# Patient Record
Sex: MALE | Race: ASIAN | NOT HISPANIC OR LATINO | URBAN - METROPOLITAN AREA
[De-identification: names, ages, dates, MRNs, and addresses within clinical notes are randomized per-mention and may not be internally consistent; named-entity substitution may affect disease eponyms.]

---

## 2022-03-14 VITALS
SYSTOLIC BLOOD PRESSURE: 140 MMHG | DIASTOLIC BLOOD PRESSURE: 92 MMHG | OXYGEN SATURATION: 98 % | TEMPERATURE: 97 F | WEIGHT: 190.04 LBS | HEIGHT: 66 IN | HEART RATE: 54 BPM | RESPIRATION RATE: 16 BRPM

## 2022-03-14 RX ORDER — CHLORHEXIDINE GLUCONATE 213 G/1000ML
1 SOLUTION TOPICAL ONCE
Refills: 0 | Status: DISCONTINUED | OUTPATIENT
Start: 2022-03-16 | End: 2022-03-31

## 2022-03-14 NOTE — H&P ADULT - NSHPLABSRESULTS_GEN_ALL_CORE
17.2   10.49 )-----------( 200      ( 16 Mar 2022 14:20 )             50.5       03-16    139  |  105  |  13  ----------------------------<  158<H>  4.4   |  21<L>  |  0.79    Ca    9.1      16 Mar 2022 14:20    TPro  6.9  /  Alb  4.2  /  TBili  0.8  /  DBili  x   /  AST  18  /  ALT  26  /  AlkPhos  104  03-16      PT/INR - ( 16 Mar 2022 14:20 )   PT: 13.0 sec;   INR: 1.09          PTT - ( 16 Mar 2022 14:20 )  PTT:29.5 sec    CARDIAC MARKERS ( 16 Mar 2022 14:20 )  x     / x     / 69 U/L / x     / 3.2 ng/mL            EKG: 17.2   10.49 )-----------( 200      ( 16 Mar 2022 14:20 )             50.5       03-16    139  |  105  |  13  ----------------------------<  158<H>  4.4   |  21<L>  |  0.79    Ca    9.1      16 Mar 2022 14:20    TPro  6.9  /  Alb  4.2  /  TBili  0.8  /  DBili  x   /  AST  18  /  ALT  26  /  AlkPhos  104  03-16      PT/INR - ( 16 Mar 2022 14:20 )   PT: 13.0 sec;   INR: 1.09          PTT - ( 16 Mar 2022 14:20 )  PTT:29.5 sec    CARDIAC MARKERS ( 16 Mar 2022 14:20 )  x     / x     / 69 U/L / x     / 3.2 n

## 2022-03-14 NOTE — H&P ADULT - ASSESSMENT
57 y/o Mandarin speaking M, recent active smoker (quit 5 days ago), PMH of HTN, HLD, newly diagnosed DM (HgA1c 8.5 on recent labs 3/15), Dilated cardiomyopathy (EF 29%) with frequent PVCs, ?AFib (cannot tolerate Xarelto 2/2 hematuria), Kidney stone, GIB in 11/2021 (now on ASA) who presented to outpatient cardiologist Dr. Kaminski with c/o class IV anginal equivalent symptoms and was found with an abnormal ECHO showing EF 29%, newly diagnosed cardiomyopathy.  Given patient's risk factors, CCS Class III/IV anginal equivalent symptoms and recent abnormal ECHO,  patient is recommended for cardiac catheterization with possible intervention if clinically indicated.     EKG:	  				  ASA _____				Mallampati class: _________	            Anginal Class: _________    -Allergy Status:   -H/H = *****Pt denies BRBPR, hematuria, hematochezia, melena. Pt given ASA:  and Plavix:     -BUN/Cr = **. EF****. Euvolemic on exam. IV NS @ *****started pre procedure    Sedation Plan:   ? None   ? Moderate    ?  Deep    ?  General Anesthesia   Patient Is Suitable Candidate For Sedation?     ? Yes   ? No   ? Not Applicable     Risks & benefits of procedure and sedation and risks and benefits for the alternative therapy have been explained to the patient in layman’s terms including but not limited to: allergic reaction, bleeding, infection, arrhythmia, respiratory compromise, renal and vascular compromise, limb damage, MI, CVA, emergent CABG/Vascular Surgery and death. Informed consent obtained and in chart.   55 y/o Mandarin speaking M, recent active smoker (quit 5 days ago), PMH of HTN, HLD, newly diagnosed DM (HgA1c 8.5 on recent labs 3/15), Dilated cardiomyopathy (EF 29%) with frequent PVCs, ?AFib (cannot tolerate Xarelto 2/2 hematuria), Kidney stone, GIB in 11/2021 (now on ASA) who presented to outpatient cardiologist Dr. Kaminski with c/o class IV anginal equivalent symptoms and was found with an abnormal ECHO showing EF 29%, newly diagnosed cardiomyopathy.  Given patient's risk factors, CCS Class III/IV anginal equivalent symptoms and recent abnormal ECHO,  patient is recommended for cardiac catheterization with possible intervention if clinically indicated.     EKG:	AF @ 104 bpm,  Bigimeny	  Mallampati class: II    Anginal Class: III    -Allergy Status: NKDA  -H/H = 17.2/50.   Pt denies BRBPR, hematuria, hematochezia, melena. Pt took home ASA 81mg and was loaded with Plavix 600mg:     -BUN/Cr = 13/0.79.  EF 29%.  Euvolemic on exam. IV NS @ 50cc with frequent lung checks, started pre procedure    Sedation Plan: Moderate    Patient Is Suitable Candidate For Sedation:  Yes      Risks & benefits of procedure and sedation and risks and benefits for the alternative therapy have been explained to the patient in layman’s terms including but not limited to: allergic reaction, bleeding, infection, arrhythmia, respiratory compromise, renal and vascular compromise, limb damage, MI, CVA, emergent CABG/Vascular Surgery and death. Informed consent obtained and in chart.

## 2022-03-14 NOTE — H&P ADULT - HISTORY OF PRESENT ILLNESS
Cardiologist: Dr. Montana Lindo  Escort:  Pharmacy: [facility] [phone]  COVID:    *Meds verified w/ pharmacy on 3/14 (pt ordered for Entresto 24/26 BID however hasn't started bc needs prior auth, didn't include in med rec)*    55 yo ____ speaking M, active smoker, with a PMH of HTN, HLD, dilated cardiomyopathy (EF 29%) with frequent PVCs, ?AFib (cannot tolerate Xarelto 2/2 hematuria - hx of kidney stone), hx of GIB in 11/2021 (now on ASA) who presented to outpatient cardiologist Dr. Kaminski with complaints of MORILLO of moderate exertion over the past month, relieved with rest. Denies CP, dizziness, diaphoresis, palpitations, orthopnea/PND, abdominal pain, N/V/D, urinary sx, BRBPR, hematuria, melena, LE swelling, recent travel/sick contacts/illness. Echocardiogram 3/12/22: LVEF 29%, non significant valvular dz. In light of patient's risk factors, CCS angina equivalent class ____ symptoms and newly reduced EF w/ frequent PVCs, patient is referred for cardiac catheterization with possible intervention if clinically indicated.  Cardiologist: Dr. Montana Lindo  Escort:   Pharmacy: [facility] [phone]  COVID:     *Meds verified w/ pharmacy on 3/14 (pt ordered for Entresto 24/26 BID however hasn't started bc needs prior auth, didn't include in med rec)*    57 yo ____ speaking M, active smoker, with a PMH of HTN, HLD, dilated cardiomyopathy (EF 29%) with frequent PVCs, ?AFib (cannot tolerate Xarelto 2/2 hematuria - hx of kidney stone), hx of GIB in 11/2021 (now on ASA) who presented to outpatient cardiologist Dr. Kaminski with complaints of MORILLO of moderate exertion over the past month, relieved with rest. Denies CP, dizziness, diaphoresis, palpitations, orthopnea/PND, abdominal pain, N/V/D, urinary sx, BRBPR, hematuria, melena, LE swelling, recent travel/sick contacts/illness. Echocardiogram 3/12/22: LVEF 29%, non significant valvular dz. In light of patient's risk factors, CCS angina equivalent class ____ symptoms and newly reduced EF w/ frequent PVCs, patient is referred for cardiac catheterization with possible intervention if clinically indicated.  Cardiologist: Dr. Montana Lindo  Escort: friend  Pharmacy: [facility] [phone]  COVID: no documentation    *Meds verified w/ pharmacy on 3/14 (pt ordered for Entresto 24/26 BID, pt has not started taking, pending insurance auth)     57 y/o Mandarin speaking M, recent active smoker (quit 5 days ago), PMH of HTN, HLD, newly diagnosed DM (HgA1c 8.5 on recent labs 3/15), Dilated cardiomyopathy (EF 29%) with frequent PVCs, ?AFib (cannot tolerate Xarelto 2/2 hematuria), Kidney stone,  GIB in 11/2021 (now on ASA) who presented to outpatient cardiologist Dr. Kaminski with c/o MORILLO with moderate exertion, symptoms began over the past month, relieved with rest. Today pt is endorsing worsening SOB, now at rest, denies active CP, palpitations, diaphoresis, orthopnea/PND, abdominal pain, N/V/D, urinary sx, BRBPR, hematuria, melena, LE swelling, recent travel/sick contacts/illness.  Subsequent Echocardiogram 3/12/22: LVEF 29%, non significant valvular dz.    In light of patient's risk factors, CCS Class III/IV anginal equivalent symptoms and recent abnormal ECHO,  patient is recommended for cardiac catheterization with possible intervention if clinically indicated.  Cardiologist: Dr. Montana Lindo  Escort: friend  [facility] [phone]  COVID: no documentation    *Meds verified w/ pharmacy on 3/14 (pt ordered for Entresto 24/26 BID, pt has not started taking, pending insurance auth)     57 y/o Mandarin speaking M, recent active smoker (quit 5 days ago), PMH of HTN, HLD, newly diagnosed DM (HgA1c 8.5 on recent labs 3/15), Dilated cardiomyopathy (EF 29%) with frequent PVCs, ?AFib (cannot tolerate Xarelto 2/2 hematuria), Kidney stone,  GIB in 11/2021 (now on ASA) who presented to outpatient cardiologist Dr. Lindo with c/o MORILLO with moderate exertion, symptoms began over the past month, relieved with rest. Today pt is endorsing worsening SOB, now at rest, denies active CP, palpitations, diaphoresis, orthopnea/PND, abdominal pain, N/V/D, urinary sx, BRBPR, hematuria, melena, LE swelling, recent travel/sick contacts/illness.  Subsequent Echocardiogram 3/12/22: LVEF 29%, non significant valvular dz.    In light of patient's risk factors, CCS Class III/IV anginal equivalent symptoms and recent abnormal ECHO,  patient is recommended for cardiac catheterization with possible intervention if clinically indicated.  Cardiologist: Dr. Montana Lindo  Escort: friend  Pharmacy: [facility] [phone]  COVID @ CTC: Negative on 3/14/22    *Meds verified w/ pharmacy on 3/14 (pt ordered for Entresto 24/26 BID, pt has not started taking, pending insurance auth)     55 y/o Mandarin speaking M, recent active smoker (quit 5 days ago), PMH of HTN, HLD, newly diagnosed DM (HgA1c 8.5 on recent labs 3/15), Dilated cardiomyopathy (EF 29%) with frequent PVCs, ?AFib (cannot tolerate Xarelto 2/2 hematuria), Kidney stone,  GIB in 11/2021 (now on ASA) who presented to outpatient cardiologist Dr. Lindo with c/o MORILLO with moderate exertion, symptoms began over the past month, relieved with rest. Today pt is endorsing worsening SOB, now at rest, denies active CP, palpitations, diaphoresis, orthopnea/PND, abdominal pain, N/V/D, urinary sx, BRBPR, hematuria, melena, LE swelling, recent travel/sick contacts/illness.  Subsequent Echocardiogram 3/12/22: LVEF 29%, non significant valvular dz.    In light of patient's risk factors, CCS Class III/IV anginal equivalent symptoms and recent abnormal ECHO,  patient is recommended for cardiac catheterization with possible intervention if clinically indicated.

## 2022-03-16 ENCOUNTER — OUTPATIENT (OUTPATIENT)
Dept: OUTPATIENT SERVICES | Facility: HOSPITAL | Age: 57
LOS: 1 days | Discharge: HOME | End: 2022-03-16
Payer: COMMERCIAL

## 2022-03-16 LAB
A1C WITH ESTIMATED AVERAGE GLUCOSE RESULT: 8.6 % — HIGH (ref 4–5.6)
ALBUMIN SERPL ELPH-MCNC: 4.2 G/DL — SIGNIFICANT CHANGE UP (ref 3.3–5)
ALP SERPL-CCNC: 104 U/L — SIGNIFICANT CHANGE UP (ref 40–120)
ALT FLD-CCNC: 26 U/L — SIGNIFICANT CHANGE UP (ref 10–45)
ANION GAP SERPL CALC-SCNC: 11 MMOL/L — SIGNIFICANT CHANGE UP (ref 5–17)
ANION GAP SERPL CALC-SCNC: 13 MMOL/L — SIGNIFICANT CHANGE UP (ref 5–17)
APTT BLD: 29.5 SEC — SIGNIFICANT CHANGE UP (ref 27.5–35.5)
AST SERPL-CCNC: 18 U/L — SIGNIFICANT CHANGE UP (ref 10–40)
BASOPHILS # BLD AUTO: 0.05 K/UL — SIGNIFICANT CHANGE UP (ref 0–0.2)
BASOPHILS NFR BLD AUTO: 0.5 % — SIGNIFICANT CHANGE UP (ref 0–2)
BILIRUB SERPL-MCNC: 0.8 MG/DL — SIGNIFICANT CHANGE UP (ref 0.2–1.2)
BUN SERPL-MCNC: 12 MG/DL — SIGNIFICANT CHANGE UP (ref 7–23)
BUN SERPL-MCNC: 13 MG/DL — SIGNIFICANT CHANGE UP (ref 7–23)
CALCIUM SERPL-MCNC: 8.4 MG/DL — SIGNIFICANT CHANGE UP (ref 8.4–10.5)
CALCIUM SERPL-MCNC: 9.1 MG/DL — SIGNIFICANT CHANGE UP (ref 8.4–10.5)
CHLORIDE SERPL-SCNC: 104 MMOL/L — SIGNIFICANT CHANGE UP (ref 96–108)
CHLORIDE SERPL-SCNC: 105 MMOL/L — SIGNIFICANT CHANGE UP (ref 96–108)
CHOLEST SERPL-MCNC: 134 MG/DL — SIGNIFICANT CHANGE UP
CK MB CFR SERPL CALC: 3.2 NG/ML — SIGNIFICANT CHANGE UP (ref 0–6.7)
CK SERPL-CCNC: 69 U/L — SIGNIFICANT CHANGE UP (ref 30–200)
CO2 SERPL-SCNC: 20 MMOL/L — LOW (ref 22–31)
CO2 SERPL-SCNC: 21 MMOL/L — LOW (ref 22–31)
CREAT SERPL-MCNC: 0.74 MG/DL — SIGNIFICANT CHANGE UP (ref 0.5–1.3)
CREAT SERPL-MCNC: 0.79 MG/DL — SIGNIFICANT CHANGE UP (ref 0.5–1.3)
EGFR: 104 ML/MIN/1.73M2 — SIGNIFICANT CHANGE UP
EGFR: 106 ML/MIN/1.73M2 — SIGNIFICANT CHANGE UP
EOSINOPHIL # BLD AUTO: 0.45 K/UL — SIGNIFICANT CHANGE UP (ref 0–0.5)
EOSINOPHIL NFR BLD AUTO: 4.3 % — SIGNIFICANT CHANGE UP (ref 0–6)
ESTIMATED AVERAGE GLUCOSE: 200 MG/DL — HIGH (ref 68–114)
GLUCOSE BLDC GLUCOMTR-MCNC: 117 MG/DL — HIGH (ref 70–99)
GLUCOSE SERPL-MCNC: 158 MG/DL — HIGH (ref 70–99)
GLUCOSE SERPL-MCNC: 234 MG/DL — HIGH (ref 70–99)
HCT VFR BLD CALC: 44.9 % — SIGNIFICANT CHANGE UP (ref 39–50)
HCT VFR BLD CALC: 50.5 % — HIGH (ref 39–50)
HCV AB S/CO SERPL IA: 0.04 S/CO — SIGNIFICANT CHANGE UP
HCV AB SERPL-IMP: SIGNIFICANT CHANGE UP
HDLC SERPL-MCNC: 36 MG/DL — LOW
HGB BLD-MCNC: 15.2 G/DL — SIGNIFICANT CHANGE UP (ref 13–17)
HGB BLD-MCNC: 17.2 G/DL — HIGH (ref 13–17)
IMM GRANULOCYTES NFR BLD AUTO: 0.3 % — SIGNIFICANT CHANGE UP (ref 0–1.5)
INR BLD: 1.09 — SIGNIFICANT CHANGE UP (ref 0.88–1.16)
LIPID PNL WITH DIRECT LDL SERPL: 55 MG/DL — SIGNIFICANT CHANGE UP
LYMPHOCYTES # BLD AUTO: 3.71 K/UL — HIGH (ref 1–3.3)
LYMPHOCYTES # BLD AUTO: 35.4 % — SIGNIFICANT CHANGE UP (ref 13–44)
MAGNESIUM SERPL-MCNC: 1.7 MG/DL — SIGNIFICANT CHANGE UP (ref 1.6–2.6)
MCHC RBC-ENTMCNC: 29.7 PG — SIGNIFICANT CHANGE UP (ref 27–34)
MCHC RBC-ENTMCNC: 29.7 PG — SIGNIFICANT CHANGE UP (ref 27–34)
MCHC RBC-ENTMCNC: 33.9 GM/DL — SIGNIFICANT CHANGE UP (ref 32–36)
MCHC RBC-ENTMCNC: 34.1 GM/DL — SIGNIFICANT CHANGE UP (ref 32–36)
MCV RBC AUTO: 87.2 FL — SIGNIFICANT CHANGE UP (ref 80–100)
MCV RBC AUTO: 87.7 FL — SIGNIFICANT CHANGE UP (ref 80–100)
MONOCYTES # BLD AUTO: 0.71 K/UL — SIGNIFICANT CHANGE UP (ref 0–0.9)
MONOCYTES NFR BLD AUTO: 6.8 % — SIGNIFICANT CHANGE UP (ref 2–14)
NEUTROPHILS # BLD AUTO: 5.54 K/UL — SIGNIFICANT CHANGE UP (ref 1.8–7.4)
NEUTROPHILS NFR BLD AUTO: 52.7 % — SIGNIFICANT CHANGE UP (ref 43–77)
NON HDL CHOLESTEROL: 98 MG/DL — SIGNIFICANT CHANGE UP
NRBC # BLD: 0 /100 WBCS — SIGNIFICANT CHANGE UP (ref 0–0)
NRBC # BLD: 0 /100 WBCS — SIGNIFICANT CHANGE UP (ref 0–0)
PLATELET # BLD AUTO: 177 K/UL — SIGNIFICANT CHANGE UP (ref 150–400)
PLATELET # BLD AUTO: 200 K/UL — SIGNIFICANT CHANGE UP (ref 150–400)
POTASSIUM SERPL-MCNC: 4.4 MMOL/L — SIGNIFICANT CHANGE UP (ref 3.5–5.3)
POTASSIUM SERPL-MCNC: 4.4 MMOL/L — SIGNIFICANT CHANGE UP (ref 3.5–5.3)
POTASSIUM SERPL-SCNC: 4.4 MMOL/L — SIGNIFICANT CHANGE UP (ref 3.5–5.3)
POTASSIUM SERPL-SCNC: 4.4 MMOL/L — SIGNIFICANT CHANGE UP (ref 3.5–5.3)
PROT SERPL-MCNC: 6.9 G/DL — SIGNIFICANT CHANGE UP (ref 6–8.3)
PROTHROM AB SERPL-ACNC: 13 SEC — SIGNIFICANT CHANGE UP (ref 10.5–13.4)
RBC # BLD: 5.12 M/UL — SIGNIFICANT CHANGE UP (ref 4.2–5.8)
RBC # BLD: 5.79 M/UL — SIGNIFICANT CHANGE UP (ref 4.2–5.8)
RBC # FLD: 14.1 % — SIGNIFICANT CHANGE UP (ref 10.3–14.5)
RBC # FLD: 14.3 % — SIGNIFICANT CHANGE UP (ref 10.3–14.5)
SODIUM SERPL-SCNC: 135 MMOL/L — SIGNIFICANT CHANGE UP (ref 135–145)
SODIUM SERPL-SCNC: 139 MMOL/L — SIGNIFICANT CHANGE UP (ref 135–145)
TRIGL SERPL-MCNC: 216 MG/DL — HIGH
WBC # BLD: 10.49 K/UL — SIGNIFICANT CHANGE UP (ref 3.8–10.5)
WBC # BLD: 8.95 K/UL — SIGNIFICANT CHANGE UP (ref 3.8–10.5)
WBC # FLD AUTO: 10.49 K/UL — SIGNIFICANT CHANGE UP (ref 3.8–10.5)
WBC # FLD AUTO: 8.95 K/UL — SIGNIFICANT CHANGE UP (ref 3.8–10.5)

## 2022-03-16 PROCEDURE — 80061 LIPID PANEL: CPT

## 2022-03-16 PROCEDURE — 93010 ELECTROCARDIOGRAM REPORT: CPT | Mod: 77

## 2022-03-16 PROCEDURE — C1887: CPT

## 2022-03-16 PROCEDURE — 93005 ELECTROCARDIOGRAM TRACING: CPT

## 2022-03-16 PROCEDURE — C1874: CPT

## 2022-03-16 PROCEDURE — 80053 COMPREHEN METABOLIC PANEL: CPT

## 2022-03-16 PROCEDURE — C1769: CPT

## 2022-03-16 PROCEDURE — 93458 L HRT ARTERY/VENTRICLE ANGIO: CPT | Mod: 59

## 2022-03-16 PROCEDURE — 82962 GLUCOSE BLOOD TEST: CPT

## 2022-03-16 PROCEDURE — 85610 PROTHROMBIN TIME: CPT

## 2022-03-16 PROCEDURE — 92978 ENDOLUMINL IVUS OCT C 1ST: CPT | Mod: 26,RC

## 2022-03-16 PROCEDURE — 83036 HEMOGLOBIN GLYCOSYLATED A1C: CPT

## 2022-03-16 PROCEDURE — C1894: CPT

## 2022-03-16 PROCEDURE — 99152 MOD SED SAME PHYS/QHP 5/>YRS: CPT

## 2022-03-16 PROCEDURE — 93458 L HRT ARTERY/VENTRICLE ANGIO: CPT | Mod: 26,59

## 2022-03-16 PROCEDURE — 82553 CREATINE MB FRACTION: CPT

## 2022-03-16 PROCEDURE — 86803 HEPATITIS C AB TEST: CPT

## 2022-03-16 PROCEDURE — 83735 ASSAY OF MAGNESIUM: CPT

## 2022-03-16 PROCEDURE — 85730 THROMBOPLASTIN TIME PARTIAL: CPT

## 2022-03-16 PROCEDURE — 93010 ELECTROCARDIOGRAM REPORT: CPT | Mod: 59

## 2022-03-16 PROCEDURE — C1753: CPT

## 2022-03-16 PROCEDURE — 99153 MOD SED SAME PHYS/QHP EA: CPT

## 2022-03-16 PROCEDURE — 92928 PRQ TCAT PLMT NTRAC ST 1 LES: CPT | Mod: RC

## 2022-03-16 PROCEDURE — 80048 BASIC METABOLIC PNL TOTAL CA: CPT

## 2022-03-16 PROCEDURE — 82550 ASSAY OF CK (CPK): CPT

## 2022-03-16 PROCEDURE — C1725: CPT

## 2022-03-16 PROCEDURE — 85027 COMPLETE CBC AUTOMATED: CPT

## 2022-03-16 PROCEDURE — 85025 COMPLETE CBC W/AUTO DIFF WBC: CPT

## 2022-03-16 PROCEDURE — C9600: CPT | Mod: RC

## 2022-03-16 PROCEDURE — 92978 ENDOLUMINL IVUS OCT C 1ST: CPT | Mod: RC

## 2022-03-16 RX ORDER — ASPIRIN/CALCIUM CARB/MAGNESIUM 324 MG
1 TABLET ORAL
Qty: 0 | Refills: 0 | DISCHARGE

## 2022-03-16 RX ORDER — CLOPIDOGREL BISULFATE 75 MG/1
600 TABLET, FILM COATED ORAL ONCE
Refills: 0 | Status: COMPLETED | OUTPATIENT
Start: 2022-03-16 | End: 2022-03-16

## 2022-03-16 RX ORDER — CLOPIDOGREL BISULFATE 75 MG/1
1 TABLET, FILM COATED ORAL
Qty: 30 | Refills: 11
Start: 2022-03-16 | End: 2023-03-10

## 2022-03-16 RX ORDER — ASPIRIN/CALCIUM CARB/MAGNESIUM 324 MG
1 TABLET ORAL
Qty: 30 | Refills: 11
Start: 2022-03-16 | End: 2023-03-10

## 2022-03-16 RX ORDER — SODIUM CHLORIDE 9 MG/ML
500 INJECTION INTRAMUSCULAR; INTRAVENOUS; SUBCUTANEOUS
Refills: 0 | Status: DISCONTINUED | OUTPATIENT
Start: 2022-03-16 | End: 2022-03-31

## 2022-03-16 RX ORDER — CARVEDILOL PHOSPHATE 80 MG/1
12.5 CAPSULE, EXTENDED RELEASE ORAL ONCE
Refills: 0 | Status: COMPLETED | OUTPATIENT
Start: 2022-03-16 | End: 2022-03-16

## 2022-03-16 RX ADMIN — SODIUM CHLORIDE 100 MILLILITER(S): 9 INJECTION INTRAMUSCULAR; INTRAVENOUS; SUBCUTANEOUS at 18:23

## 2022-03-16 RX ADMIN — SODIUM CHLORIDE 50 MILLILITER(S): 9 INJECTION INTRAMUSCULAR; INTRAVENOUS; SUBCUTANEOUS at 15:31

## 2022-03-16 RX ADMIN — CARVEDILOL PHOSPHATE 12.5 MILLIGRAM(S): 80 CAPSULE, EXTENDED RELEASE ORAL at 18:23

## 2022-03-16 RX ADMIN — CLOPIDOGREL BISULFATE 600 MILLIGRAM(S): 75 TABLET, FILM COATED ORAL at 15:36

## 2022-03-16 NOTE — PROGRESS NOTE ADULT - SUBJECTIVE AND OBJECTIVE BOX
Interventional Cardiology PA Post PCI SDA Discharge Note    Patient without complaints. Ambulated and voided without difficulties    Afebrile, VSS    Ext: Right Radial: no hematoma, no bleeding, dressing C/D/I    Pulses: intact RAD/DP/PT to baseline     A/P: 55 y/o Mandarin speaking male, VERY RECENT FORMER SMOKER (quit 5 days ago), w/ PMH HTN, HLD, newly diagnosed DM (HgA1c 8.5 on recent labs from 03/15/2022), dilated cardiomyopathy (w/ EF 29%), frequent PVCs, A-Fib (cannot tolerate Xarelto 2/2 hematuria), history of kidney stones, and prior GI bleed (in 11/2021, currently tolerating Aspirin) who presented for cardiac catheterization w/ possible intervention if clinically indicated in light of patient's risk factors, anginal symptoms, and abnormal Echocardiogram findings. Patient is now s/p cardiac catheterization w/ PATRICE mid RCA and other findings of mid LCx 80%, distal LCx 80%, OM1 80%, OM2 90%, LAD mild diffuse disease, distal RCA 30%, RPDA small w/ moderate disease, and EDP 10 mmHg. Right radial access used, and radial band was eventually removed appropriately and w/o complications.     1. Follow-up with PMD/Cardiologist Dr. Montana Lindo in 72 hours.  2. Post procedure labs/EKG reviewed and stable.    3. Patient given instructions on importance of taking antiplatelet medication.    4. Stable for discharge as per attending Dr. Ag after bed rest, patient voids, wrist stable and 30 minutes of ambulation.  5. Prescriptions for Aspirin/Plavix e-prescribed to patient's preferred pharmacy.   6. Patient will continue Lipitor 40 mg daily.   7. Discharge forms signed and copies in chart.

## 2022-03-22 DIAGNOSIS — I25.110 ATHEROSCLEROTIC HEART DISEASE OF NATIVE CORONARY ARTERY WITH UNSTABLE ANGINA PECTORIS: ICD-10-CM

## 2022-04-28 PROBLEM — I42.0 DILATED CARDIOMYOPATHY: Chronic | Status: ACTIVE | Noted: 2022-03-14

## 2022-04-28 PROBLEM — E78.5 HYPERLIPIDEMIA, UNSPECIFIED: Chronic | Status: ACTIVE | Noted: 2022-03-14

## 2022-05-06 ENCOUNTER — INPATIENT (INPATIENT)
Facility: HOSPITAL | Age: 57
LOS: 0 days | Discharge: HOME | DRG: 247 | End: 2022-05-07
Attending: INTERNAL MEDICINE | Admitting: INTERNAL MEDICINE
Payer: COMMERCIAL

## 2022-05-06 VITALS
HEIGHT: 66.93 IN | TEMPERATURE: 97 F | DIASTOLIC BLOOD PRESSURE: 80 MMHG | RESPIRATION RATE: 18 BRPM | SYSTOLIC BLOOD PRESSURE: 143 MMHG | HEART RATE: 51 BPM | WEIGHT: 187.39 LBS | OXYGEN SATURATION: 51 %

## 2022-05-06 LAB
A1C WITH ESTIMATED AVERAGE GLUCOSE RESULT: 9.2 % — HIGH (ref 4–5.6)
ALBUMIN SERPL ELPH-MCNC: 3.9 G/DL — SIGNIFICANT CHANGE UP (ref 3.3–5)
ALP SERPL-CCNC: 127 U/L — HIGH (ref 40–120)
ALT FLD-CCNC: 28 U/L — SIGNIFICANT CHANGE UP (ref 10–45)
ANION GAP SERPL CALC-SCNC: 11 MMOL/L — SIGNIFICANT CHANGE UP (ref 5–17)
APTT BLD: 31.4 SEC — SIGNIFICANT CHANGE UP (ref 27.5–35.5)
AST SERPL-CCNC: 22 U/L — SIGNIFICANT CHANGE UP (ref 10–40)
BASOPHILS # BLD AUTO: 0.05 K/UL — SIGNIFICANT CHANGE UP (ref 0–0.2)
BASOPHILS NFR BLD AUTO: 0.5 % — SIGNIFICANT CHANGE UP (ref 0–2)
BILIRUB SERPL-MCNC: 0.7 MG/DL — SIGNIFICANT CHANGE UP (ref 0.2–1.2)
BUN SERPL-MCNC: 20 MG/DL — SIGNIFICANT CHANGE UP (ref 7–23)
CALCIUM SERPL-MCNC: 9.4 MG/DL — SIGNIFICANT CHANGE UP (ref 8.4–10.5)
CHLORIDE SERPL-SCNC: 102 MMOL/L — SIGNIFICANT CHANGE UP (ref 96–108)
CHOLEST SERPL-MCNC: 132 MG/DL — SIGNIFICANT CHANGE UP
CK MB CFR SERPL CALC: 3.3 NG/ML — SIGNIFICANT CHANGE UP (ref 0–6.7)
CK SERPL-CCNC: 76 U/L — SIGNIFICANT CHANGE UP (ref 30–200)
CO2 SERPL-SCNC: 22 MMOL/L — SIGNIFICANT CHANGE UP (ref 22–31)
CREAT SERPL-MCNC: 0.75 MG/DL — SIGNIFICANT CHANGE UP (ref 0.5–1.3)
EGFR: 105 ML/MIN/1.73M2 — SIGNIFICANT CHANGE UP
EOSINOPHIL # BLD AUTO: 0.52 K/UL — HIGH (ref 0–0.5)
EOSINOPHIL NFR BLD AUTO: 5.7 % — SIGNIFICANT CHANGE UP (ref 0–6)
ESTIMATED AVERAGE GLUCOSE: 217 MG/DL — HIGH (ref 68–114)
GLUCOSE BLDC GLUCOMTR-MCNC: 189 MG/DL — HIGH (ref 70–99)
GLUCOSE BLDC GLUCOMTR-MCNC: 209 MG/DL — HIGH (ref 70–99)
GLUCOSE SERPL-MCNC: 336 MG/DL — HIGH (ref 70–99)
HCT VFR BLD CALC: 51 % — HIGH (ref 39–50)
HDLC SERPL-MCNC: 31 MG/DL — LOW
HGB BLD-MCNC: 17.4 G/DL — HIGH (ref 13–17)
IMM GRANULOCYTES NFR BLD AUTO: 0.3 % — SIGNIFICANT CHANGE UP (ref 0–1.5)
INR BLD: 1.09 — SIGNIFICANT CHANGE UP (ref 0.88–1.16)
LDLC SERPL DIRECT ASSAY-MCNC: 70 MG/DL — SIGNIFICANT CHANGE UP
LIPID PNL WITH DIRECT LDL SERPL: SIGNIFICANT CHANGE UP MG/DL
LYMPHOCYTES # BLD AUTO: 3 K/UL — SIGNIFICANT CHANGE UP (ref 1–3.3)
LYMPHOCYTES # BLD AUTO: 32.8 % — SIGNIFICANT CHANGE UP (ref 13–44)
MCHC RBC-ENTMCNC: 30.2 PG — SIGNIFICANT CHANGE UP (ref 27–34)
MCHC RBC-ENTMCNC: 34.1 GM/DL — SIGNIFICANT CHANGE UP (ref 32–36)
MCV RBC AUTO: 88.5 FL — SIGNIFICANT CHANGE UP (ref 80–100)
MONOCYTES # BLD AUTO: 0.72 K/UL — SIGNIFICANT CHANGE UP (ref 0–0.9)
MONOCYTES NFR BLD AUTO: 7.9 % — SIGNIFICANT CHANGE UP (ref 2–14)
NEUTROPHILS # BLD AUTO: 4.83 K/UL — SIGNIFICANT CHANGE UP (ref 1.8–7.4)
NEUTROPHILS NFR BLD AUTO: 52.8 % — SIGNIFICANT CHANGE UP (ref 43–77)
NON HDL CHOLESTEROL: 101 MG/DL — SIGNIFICANT CHANGE UP
NRBC # BLD: 0 /100 WBCS — SIGNIFICANT CHANGE UP (ref 0–0)
PLATELET # BLD AUTO: 173 K/UL — SIGNIFICANT CHANGE UP (ref 150–400)
POTASSIUM SERPL-MCNC: 4.4 MMOL/L — SIGNIFICANT CHANGE UP (ref 3.5–5.3)
POTASSIUM SERPL-SCNC: 4.4 MMOL/L — SIGNIFICANT CHANGE UP (ref 3.5–5.3)
PROT SERPL-MCNC: 6.7 G/DL — SIGNIFICANT CHANGE UP (ref 6–8.3)
PROTHROM AB SERPL-ACNC: 13 SEC — SIGNIFICANT CHANGE UP (ref 10.5–13.4)
RBC # BLD: 5.76 M/UL — SIGNIFICANT CHANGE UP (ref 4.2–5.8)
RBC # FLD: 14.3 % — SIGNIFICANT CHANGE UP (ref 10.3–14.5)
SODIUM SERPL-SCNC: 135 MMOL/L — SIGNIFICANT CHANGE UP (ref 135–145)
TRIGL SERPL-MCNC: 487 MG/DL — HIGH
WBC # BLD: 9.15 K/UL — SIGNIFICANT CHANGE UP (ref 3.8–10.5)
WBC # FLD AUTO: 9.15 K/UL — SIGNIFICANT CHANGE UP (ref 3.8–10.5)

## 2022-05-06 PROCEDURE — 92929: CPT | Mod: LC

## 2022-05-06 PROCEDURE — 92978 ENDOLUMINL IVUS OCT C 1ST: CPT | Mod: 26,LC

## 2022-05-06 PROCEDURE — 99152 MOD SED SAME PHYS/QHP 5/>YRS: CPT

## 2022-05-06 PROCEDURE — 92928 PRQ TCAT PLMT NTRAC ST 1 LES: CPT | Mod: LC

## 2022-05-06 RX ORDER — ASPIRIN/CALCIUM CARB/MAGNESIUM 324 MG
81 TABLET ORAL DAILY
Refills: 0 | Status: DISCONTINUED | OUTPATIENT
Start: 2022-05-07 | End: 2022-05-07

## 2022-05-06 RX ORDER — CARVEDILOL PHOSPHATE 80 MG/1
1 CAPSULE, EXTENDED RELEASE ORAL
Qty: 0 | Refills: 0 | DISCHARGE

## 2022-05-06 RX ORDER — SACUBITRIL AND VALSARTAN 24; 26 MG/1; MG/1
1 TABLET, FILM COATED ORAL
Qty: 0 | Refills: 0 | DISCHARGE

## 2022-05-06 RX ORDER — SODIUM CHLORIDE 9 MG/ML
1000 INJECTION, SOLUTION INTRAVENOUS
Refills: 0 | Status: DISCONTINUED | OUTPATIENT
Start: 2022-05-06 | End: 2022-05-07

## 2022-05-06 RX ORDER — ATORVASTATIN CALCIUM 80 MG/1
40 TABLET, FILM COATED ORAL AT BEDTIME
Refills: 0 | Status: DISCONTINUED | OUTPATIENT
Start: 2022-05-06 | End: 2022-05-07

## 2022-05-06 RX ORDER — DAPAGLIFLOZIN 10 MG/1
10 TABLET, FILM COATED ORAL EVERY 24 HOURS
Refills: 0 | Status: DISCONTINUED | OUTPATIENT
Start: 2022-05-06 | End: 2022-05-07

## 2022-05-06 RX ORDER — SODIUM CHLORIDE 9 MG/ML
500 INJECTION INTRAMUSCULAR; INTRAVENOUS; SUBCUTANEOUS
Refills: 0 | Status: DISCONTINUED | OUTPATIENT
Start: 2022-05-06 | End: 2022-05-06

## 2022-05-06 RX ORDER — ATORVASTATIN CALCIUM 80 MG/1
1 TABLET, FILM COATED ORAL
Qty: 0 | Refills: 0 | DISCHARGE

## 2022-05-06 RX ORDER — INSULIN LISPRO 100/ML
VIAL (ML) SUBCUTANEOUS ONCE
Refills: 0 | Status: COMPLETED | OUTPATIENT
Start: 2022-05-06 | End: 2022-05-06

## 2022-05-06 RX ORDER — DEXTROSE 50 % IN WATER 50 %
12.5 SYRINGE (ML) INTRAVENOUS ONCE
Refills: 0 | Status: DISCONTINUED | OUTPATIENT
Start: 2022-05-06 | End: 2022-05-07

## 2022-05-06 RX ORDER — SODIUM CHLORIDE 9 MG/ML
500 INJECTION INTRAMUSCULAR; INTRAVENOUS; SUBCUTANEOUS
Refills: 0 | Status: DISCONTINUED | OUTPATIENT
Start: 2022-05-06 | End: 2022-05-07

## 2022-05-06 RX ORDER — DEXTROSE 50 % IN WATER 50 %
15 SYRINGE (ML) INTRAVENOUS ONCE
Refills: 0 | Status: DISCONTINUED | OUTPATIENT
Start: 2022-05-06 | End: 2022-05-07

## 2022-05-06 RX ORDER — GLUCAGON INJECTION, SOLUTION 0.5 MG/.1ML
1 INJECTION, SOLUTION SUBCUTANEOUS ONCE
Refills: 0 | Status: DISCONTINUED | OUTPATIENT
Start: 2022-05-06 | End: 2022-05-07

## 2022-05-06 RX ORDER — DAPAGLIFLOZIN 10 MG/1
1 TABLET, FILM COATED ORAL
Qty: 0 | Refills: 0 | DISCHARGE

## 2022-05-06 RX ORDER — CLOPIDOGREL BISULFATE 75 MG/1
75 TABLET, FILM COATED ORAL DAILY
Refills: 0 | Status: DISCONTINUED | OUTPATIENT
Start: 2022-05-07 | End: 2022-05-07

## 2022-05-06 RX ORDER — DEXTROSE 50 % IN WATER 50 %
25 SYRINGE (ML) INTRAVENOUS ONCE
Refills: 0 | Status: DISCONTINUED | OUTPATIENT
Start: 2022-05-06 | End: 2022-05-07

## 2022-05-06 RX ORDER — INSULIN LISPRO 100/ML
VIAL (ML) SUBCUTANEOUS
Refills: 0 | Status: DISCONTINUED | OUTPATIENT
Start: 2022-05-06 | End: 2022-05-07

## 2022-05-06 RX ORDER — SACUBITRIL AND VALSARTAN 24; 26 MG/1; MG/1
1 TABLET, FILM COATED ORAL
Refills: 0 | Status: DISCONTINUED | OUTPATIENT
Start: 2022-05-06 | End: 2022-05-07

## 2022-05-06 RX ORDER — CHLORHEXIDINE GLUCONATE 213 G/1000ML
1 SOLUTION TOPICAL ONCE
Refills: 0 | Status: DISCONTINUED | OUTPATIENT
Start: 2022-05-06 | End: 2022-05-07

## 2022-05-06 RX ADMIN — SACUBITRIL AND VALSARTAN 1 TABLET(S): 24; 26 TABLET, FILM COATED ORAL at 23:14

## 2022-05-06 RX ADMIN — Medication 2: at 23:15

## 2022-05-06 RX ADMIN — Medication 4: at 17:41

## 2022-05-06 RX ADMIN — SODIUM CHLORIDE 50 MILLILITER(S): 9 INJECTION INTRAMUSCULAR; INTRAVENOUS; SUBCUTANEOUS at 20:48

## 2022-05-06 RX ADMIN — ATORVASTATIN CALCIUM 40 MILLIGRAM(S): 80 TABLET, FILM COATED ORAL at 23:14

## 2022-05-06 NOTE — H&P ADULT - ASSESSMENT
EKG:					  ASA _____				Mallampati class: _________	            Anginal Class: _________    -Allergy Status:   -H/H = *****Pt denies BRBPR, hematuria, hematochezia, melena. Pt given ASA:  and Plavix:     -BUN/Cr = **. EF****. Euvolemic on exam. IV NS @ *****started pre procedure    Sedation Plan: Moderate   Patient Is Suitable Candidate For Sedation:  Yes        Risks & benefits of procedure and sedation and risks and benefits for the alternative therapy have been explained to the patient in layman’s terms including but not limited to: allergic reaction, bleeding, infection, arrhythmia, respiratory compromise, renal and vascular compromise, limb damage, MI, CVA, emergent CABG/Vascular Surgery and death. Informed consent obtained and in chart.   58 y/o Mandarin speaking M, recent active smoker (quit  one month ago), PMH of HTN, HLD, newly diagnosed DM (HgA1c 8.5 on recent labs 3/15), Dilated cardiomyopathy (EF 20%) with frequent PVCs, ?AFib (cannot tolerate Xarelto 2/2 hematuria), Kidney stone, GIB in 11/2021 (now on ASA), known CAD, s/p Cath 3/16/22 with Dr Ag with PATRICE mRCA (90%), residual LCx Dz, EDP 13 and now presents for staged PCI.     EKG:					  ASA: III				Mallampati class:II	            Anginal Class: II    -Allergy Status: NKDA  -H/H = *****Pt denies BRBPR, hematuria, hematochezia, melena. Pt given ASA:  and Plavix:     -BUN/Cr = **. EF****. Euvolemic on exam. IV NS @ *****started pre procedure    Sedation Plan: Moderate   Patient Is Suitable Candidate For Sedation:  Yes        Risks & benefits of procedure and sedation and risks and benefits for the alternative therapy have been explained to the patient in layman’s terms including but not limited to: allergic reaction, bleeding, infection, arrhythmia, respiratory compromise, renal and vascular compromise, limb damage, MI, CVA, emergent CABG/Vascular Surgery and death. Informed consent obtained and in chart.   58 y/o Mandarin speaking M, recent active smoker (quit  one month ago), PMH of HTN, HLD, newly diagnosed DM (HgA1c 8.5 on recent labs 3/15), Dilated cardiomyopathy (EF 20%) with frequent PVCs, ?AFib (cannot tolerate Xarelto 2/2 hematuria), Kidney stone, GIB in 11/2021 (now on ASA), known CAD, s/p Cath 3/16/22 with Dr Ag with PATRICE mRCA (90%), residual LCx Dz, EDP 13 and now presents for staged PCI.     EKG:					  ASA: III				Mallampati class:II	            Anginal Class: II    -Allergy Status: NKDA  -H/H = 17.4/51.0.   Pt denies BRBPR, hematuria, hematochezia, melena. Pt took his home ASA 81mg and Plavix 75mg at 9AM on 5/6/22  -BUN/Cr = 20/0.75.  EF 20%. Euvolemic on exam. IV NS @ 30cc KVO with frequent lung checks started pre procedure    Sedation Plan: Moderate   Patient Is Suitable Candidate For Sedation:  Yes        Risks & benefits of procedure and sedation and risks and benefits for the alternative therapy have been explained to the patient in layman’s terms including but not limited to: allergic reaction, bleeding, infection, arrhythmia, respiratory compromise, renal and vascular compromise, limb damage, MI, CVA, emergent CABG/Vascular Surgery and death. Informed consent obtained and in chart.   56 y/o Mandarin speaking M, recent active smoker (quit  one month ago), PMH of HTN, HLD, newly diagnosed DM (HgA1c 8.5 on recent labs 3/15), Dilated cardiomyopathy (EF 20%) with frequent PVCs, ?AFib (cannot tolerate Xarelto 2/2 hematuria), Kidney stone, GIB in 11/2021 (now on ASA), known CAD, s/p Cath 3/16/22 with Dr Ag with PATRICE mRCA (90%), residual LCx Dz, EDP 13 and now presents for staged PCI.     History and consent obtained via language line. Pedro Luis, ID# [id number]    Of note: Pt noted with , ISS coverage given    EKG: Pending  					  ASA: III				Mallampati class:II	            Anginal Class: II    -Allergy Status: NKDA  -H/H = 17.4/51.0.   Pt denies BRBPR, hematuria, hematochezia, melena. Pt took his home ASA 81mg and Plavix 75mg at 9AM on 5/6/22  -BUN/Cr = 20/0.75.  EF 20%. Euvolemic on exam. IV NS @ 30cc KVO with frequent lung checks started pre procedure    Sedation Plan: Moderate   Patient Is Suitable Candidate For Sedation:  Yes        Risks & benefits of procedure and sedation and risks and benefits for the alternative therapy have been explained to the patient in layman’s terms including but not limited to: allergic reaction, bleeding, infection, arrhythmia, respiratory compromise, renal and vascular compromise, limb damage, MI, CVA, emergent CABG/Vascular Surgery and death. Informed consent obtained and in chart.   56 y/o Mandarin speaking M, recent active smoker (quit  one month ago), PMH of HTN, HLD, newly diagnosed DM (HgA1c 8.5 on recent labs 3/15), Dilated cardiomyopathy (EF 20%) with frequent PVCs, ?AFib (cannot tolerate Xarelto 2/2 hematuria), Kidney stone, GIB in 11/2021 (now on ASA), known CAD, s/p Cath 3/16/22 with Dr Ag with PATRICE mRCA (90%), residual LCx Dz, EDP 13 and now presents for staged PCI.     History and consent obtained via language line. Pedro Luis, ID# [id number]    Of note: Pt noted with , ISS coverage given    EKG: AF, 93bpm, Occ PVCs, ST/T wave abn throughout, LVH. Prolonged QTc 502 ms. No acute ischemic changes  					  ASA: III				Mallampati class:II	            Anginal Class: II    -Allergy Status: NKDA  -H/H = 17.4/51.0.   Pt denies BRBPR, hematuria, hematochezia, melena. Pt took his home ASA 81mg and Plavix 75mg at 9AM on 5/6/22  -BUN/Cr = 20/0.75.  EF 20%. Euvolemic on exam. IV NS @ 30cc KVO with frequent lung checks started pre procedure    Sedation Plan: Moderate   Patient Is Suitable Candidate For Sedation:  Yes        Risks & benefits of procedure and sedation and risks and benefits for the alternative therapy have been explained to the patient in layman’s terms including but not limited to: allergic reaction, bleeding, infection, arrhythmia, respiratory compromise, renal and vascular compromise, limb damage, MI, CVA, emergent CABG/Vascular Surgery and death. Informed consent obtained and in chart.

## 2022-05-06 NOTE — H&P ADULT - HISTORY OF PRESENT ILLNESS
[facility]  Pharmacy  Escort    Pt presenting to  ---- office reporting intermittent, non-radiating, exertional, pressure-like, left sided chest pain, lasting a few seconds, precipitated by moderate physical exertion, gradually worsening x 6 months, symptoms relieved with rest.      Pt denies active CP, SOB, palpitations, diaphoresis, orthopnea, PND, dizziness, syncope, abdominal pain/discomfort, LE swelling or any other complaints at this time    In light of the pt's risk factors, current CCS Class __ Anginal symptoms and recent abnormal NST, pt now presents for recommended cardiac catheterization with possible intervention with __________   Covid PCR: 5/2/22 @  CTC:   neg  Pharmacy:  [facility], [place], [place]  Escort:  Nasreen Red    56 y/o Mandarin speaking M, recent active smoker (quit  one month ago), PMH of HTN, HLD, newly diagnosed DM (HgA1c 8.5 on recent labs 3/15), Dilated cardiomyopathy (EF 20%) with frequent PVCs, ?AFib (cannot tolerate Xarelto 2/2 hematuria), Kidney stone, GIB in 11/2021 (now on ASA), known CAD, s/p Cath 3/16/22 with Dr Ag with PATRICE mRCA (90%), residual LCx Dz, EDP 13 and now presents for staged PCI.  Pt endorses compliance on DAPT.  Denies any hospitalizations or surgeries since his last procedure.  He currently denies active CP, SOB, palpitations, diaphoresis, orthopnea, PND, dizziness, syncope, abdominal pain/discomfort, LE swelling or any other complaints at this time.      In light of patient's risk factors and recent LHC showing residual LCx lesion, pt now presents for recommended Staged PCI if clinically indicated.       Cath History  Cath 3/16/22:  PATRICE mRCA (80%), residual m/dLCx 80%, OM1 80%, OM2 90%,  LVEDP 13.      Prior ECHO 3/12/22: LVEF 29%, non significant valvular dz.        Covid PCR: 5/2/22 @  CTC:   neg  Pharmacy:  [facility], [place], [place]  Escort:  Nasreen Red    56 y/o Mandarin speaking M, recent active smoker (quit  one month ago), PMH of HTN, HLD, newly diagnosed DM (HgA1c 8.5 on recent labs 3/15), Dilated cardiomyopathy (EF 20%) with frequent PVCs, ?AFib (cannot tolerate Xarelto 2/2 hematuria), Kidney stone, GIB in 11/2021 (now on ASA), known CAD, s/p Cath 3/16/22 with Dr Ag:   PATRICE mRCA (90%), residual mLCx/OM1/OM2 Dz, EDP 13 and now presents for staged PCI.  Pt endorses compliance on DAPT.  Denies any hospitalizations or surgeries since his last procedure.  He currently denies active CP, SOB, palpitations, diaphoresis, orthopnea, PND, dizziness, syncope, abdominal pain/discomfort, LE swelling or any other complaints at this time.      In light of patient's risk factors and recent LHC showing residual LCx lesion, pt now presents for recommended Staged PCI if clinically indicated.       Cath History  Cath 3/16/22:  PATRICE mRCA (80%), residual m/dLCx 80%, OM1 80%, OM2 90%,  LVEDP 13.      Prior ECHO 3/12/22: LVEF 29%, non significant valvular dz.        Covid PCR: 5/2/22 @  CTC:   neg  Pharmacy:  [facility], [place], [place]  Escort:  Nasreen Red    History and consent obtained via language line. Pedro Luis, ID# [id number]    56 y/o Mandarin speaking M, recent active smoker (quit  one month ago), PMH of HTN, HLD, newly diagnosed DM (HgA1c 8.5 on recent labs 3/15), Dilated cardiomyopathy (EF 20%) with frequent PVCs, ?AFib (cannot tolerate Xarelto 2/2 hematuria), Kidney stone, GIB in 11/2021 (now on ASA), known CAD, s/p Cath 3/16/22 with Dr Ag:   PATRICE mRCA (90%), residual mLCx/OM1/OM2 Dz, EDP 13 and now presents for staged PCI.  Pt endorses compliance on DAPT.  Denies any hospitalizations or surgeries since his last procedure.  He currently denies active CP, SOB, palpitations, diaphoresis, orthopnea, PND, dizziness, syncope, abdominal pain/discomfort, LE swelling or any other complaints at this time.      In light of patient's risk factors and recent LHC showing residual LCx lesion, pt now presents for recommended Staged PCI if clinically indicated.       Cath History  Cath 3/16/22:  PATRICE mRCA (80%), residual m/dLCx 80%, OM1 80%, OM2 90%,  LVEDP 13.      Prior ECHO 3/12/22: LVEF 29%, non significant valvular dz.

## 2022-05-06 NOTE — PATIENT PROFILE ADULT - FALL HARM RISK - HARM RISK INTERVENTIONS

## 2022-05-06 NOTE — PATIENT PROFILE ADULT - FUNCTIONAL ASSESSMENT - BASIC MOBILITY 4.
Implemented All Universal Safety Interventions:  [place] to call system. Call bell, personal items and telephone within reach. Instruct patient to call for assistance. Room bathroom lighting operational. Non-slip footwear when patient is off stretcher. Physically safe environment: no spills, clutter or unnecessary equipment. Stretcher in lowest position, wheels locked, appropriate side rails in place.
4 = No assist / stand by assistance

## 2022-05-06 NOTE — PATIENT PROFILE ADULT - NSTRANSFERBELONGINGSDISPO_GEN_A_NUR
Quality 130: Documentation Of Current Medications In The Medical Record: Current Medications Documented Quality 226: Preventive Care And Screening: Tobacco Use: Screening And Cessation Intervention: Patient screened for tobacco use and is an ex/non-smoker Detail Level: Detailed with patient

## 2022-05-06 NOTE — H&P ADULT - NSHPLABSRESULTS_GEN_ALL_CORE
17.4   9.15  )-----------( 173      ( 06 May 2022 14:55 )             51.0       05-06    135  |  102  |  20  ----------------------------<  336<H>  4.4   |  22  |  0.75    Ca    9.4      06 May 2022 14:55    TPro  6.7  /  Alb  3.9  /  TBili  0.7  /  DBili  x   /  AST  22  /  ALT  28  /  AlkPhos  127<H>  05-06      PT/INR - ( 06 May 2022 14:55 )   PT: 13.0 sec;   INR: 1.09          PTT - ( 06 May 2022 14:55 )  PTT:31.4 sec    CARDIAC MARKERS ( 06 May 2022 14:55 )  x     / x     / 76 U/L / x     / 3.3 ng/mL

## 2022-05-07 VITALS — RESPIRATION RATE: 18 BRPM | HEART RATE: 102 BPM | DIASTOLIC BLOOD PRESSURE: 83 MMHG | SYSTOLIC BLOOD PRESSURE: 140 MMHG

## 2022-05-07 LAB
ANION GAP SERPL CALC-SCNC: 15 MMOL/L — SIGNIFICANT CHANGE UP (ref 5–17)
BASOPHILS # BLD AUTO: 0.06 K/UL — SIGNIFICANT CHANGE UP (ref 0–0.2)
BASOPHILS NFR BLD AUTO: 0.6 % — SIGNIFICANT CHANGE UP (ref 0–2)
BUN SERPL-MCNC: 18 MG/DL — SIGNIFICANT CHANGE UP (ref 7–23)
CALCIUM SERPL-MCNC: 9.1 MG/DL — SIGNIFICANT CHANGE UP (ref 8.4–10.5)
CHLORIDE SERPL-SCNC: 104 MMOL/L — SIGNIFICANT CHANGE UP (ref 96–108)
CO2 SERPL-SCNC: 19 MMOL/L — LOW (ref 22–31)
CREAT SERPL-MCNC: 0.78 MG/DL — SIGNIFICANT CHANGE UP (ref 0.5–1.3)
EGFR: 104 ML/MIN/1.73M2 — SIGNIFICANT CHANGE UP
EOSINOPHIL # BLD AUTO: 0.49 K/UL — SIGNIFICANT CHANGE UP (ref 0–0.5)
EOSINOPHIL NFR BLD AUTO: 4.5 % — SIGNIFICANT CHANGE UP (ref 0–6)
GLUCOSE BLDC GLUCOMTR-MCNC: 196 MG/DL — HIGH (ref 70–99)
GLUCOSE BLDC GLUCOMTR-MCNC: 210 MG/DL — HIGH (ref 70–99)
GLUCOSE BLDC GLUCOMTR-MCNC: 224 MG/DL — HIGH (ref 70–99)
GLUCOSE SERPL-MCNC: 203 MG/DL — HIGH (ref 70–99)
HCT VFR BLD CALC: 55.5 % — HIGH (ref 39–50)
HGB BLD-MCNC: 18.5 G/DL — HIGH (ref 13–17)
IMM GRANULOCYTES NFR BLD AUTO: 0.3 % — SIGNIFICANT CHANGE UP (ref 0–1.5)
LYMPHOCYTES # BLD AUTO: 2.84 K/UL — SIGNIFICANT CHANGE UP (ref 1–3.3)
LYMPHOCYTES # BLD AUTO: 26.2 % — SIGNIFICANT CHANGE UP (ref 13–44)
MAGNESIUM SERPL-MCNC: 1.9 MG/DL — SIGNIFICANT CHANGE UP (ref 1.6–2.6)
MCHC RBC-ENTMCNC: 30 PG — SIGNIFICANT CHANGE UP (ref 27–34)
MCHC RBC-ENTMCNC: 33.3 GM/DL — SIGNIFICANT CHANGE UP (ref 32–36)
MCV RBC AUTO: 90.1 FL — SIGNIFICANT CHANGE UP (ref 80–100)
MONOCYTES # BLD AUTO: 0.86 K/UL — SIGNIFICANT CHANGE UP (ref 0–0.9)
MONOCYTES NFR BLD AUTO: 7.9 % — SIGNIFICANT CHANGE UP (ref 2–14)
NEUTROPHILS # BLD AUTO: 6.57 K/UL — SIGNIFICANT CHANGE UP (ref 1.8–7.4)
NEUTROPHILS NFR BLD AUTO: 60.5 % — SIGNIFICANT CHANGE UP (ref 43–77)
NRBC # BLD: 0 /100 WBCS — SIGNIFICANT CHANGE UP (ref 0–0)
PLATELET # BLD AUTO: 171 K/UL — SIGNIFICANT CHANGE UP (ref 150–400)
POTASSIUM SERPL-MCNC: 4.3 MMOL/L — SIGNIFICANT CHANGE UP (ref 3.5–5.3)
POTASSIUM SERPL-SCNC: 4.3 MMOL/L — SIGNIFICANT CHANGE UP (ref 3.5–5.3)
RBC # BLD: 6.16 M/UL — HIGH (ref 4.2–5.8)
RBC # FLD: 14.6 % — HIGH (ref 10.3–14.5)
SODIUM SERPL-SCNC: 138 MMOL/L — SIGNIFICANT CHANGE UP (ref 135–145)
WBC # BLD: 10.85 K/UL — HIGH (ref 3.8–10.5)
WBC # FLD AUTO: 10.85 K/UL — HIGH (ref 3.8–10.5)

## 2022-05-07 PROCEDURE — 99232 SBSQ HOSP IP/OBS MODERATE 35: CPT

## 2022-05-07 RX ORDER — PANTOPRAZOLE SODIUM 20 MG/1
1 TABLET, DELAYED RELEASE ORAL
Qty: 30 | Refills: 11
Start: 2022-05-07 | End: 2023-05-01

## 2022-05-07 RX ORDER — NEBIVOLOL HYDROCHLORIDE 5 MG/1
1 TABLET ORAL
Qty: 30 | Refills: 2
Start: 2022-05-07 | End: 2022-08-04

## 2022-05-07 RX ORDER — CLOPIDOGREL BISULFATE 75 MG/1
1 TABLET, FILM COATED ORAL
Qty: 30 | Refills: 11
Start: 2022-05-07 | End: 2023-05-01

## 2022-05-07 RX ORDER — METOPROLOL TARTRATE 50 MG
50 TABLET ORAL ONCE
Refills: 0 | Status: COMPLETED | OUTPATIENT
Start: 2022-05-07 | End: 2022-05-07

## 2022-05-07 RX ORDER — NEBIVOLOL HYDROCHLORIDE 5 MG/1
1 TABLET ORAL
Qty: 0 | Refills: 0 | DISCHARGE

## 2022-05-07 RX ORDER — ASPIRIN/CALCIUM CARB/MAGNESIUM 324 MG
1 TABLET ORAL
Qty: 30 | Refills: 11
Start: 2022-05-07 | End: 2023-05-01

## 2022-05-07 RX ADMIN — Medication 50 MILLIGRAM(S): at 12:03

## 2022-05-07 RX ADMIN — CLOPIDOGREL BISULFATE 75 MILLIGRAM(S): 75 TABLET, FILM COATED ORAL at 12:03

## 2022-05-07 RX ADMIN — Medication 1: at 07:08

## 2022-05-07 RX ADMIN — DAPAGLIFLOZIN 10 MILLIGRAM(S): 10 TABLET, FILM COATED ORAL at 05:39

## 2022-05-07 RX ADMIN — Medication 2: at 12:06

## 2022-05-07 RX ADMIN — Medication 81 MILLIGRAM(S): at 12:03

## 2022-05-07 RX ADMIN — SACUBITRIL AND VALSARTAN 1 TABLET(S): 24; 26 TABLET, FILM COATED ORAL at 05:38

## 2022-05-07 NOTE — DISCHARGE NOTE PROVIDER - NSDCCPCAREPLAN_GEN_ALL_CORE_FT
PRINCIPAL DISCHARGE DIAGNOSIS  Diagnosis: CAD (coronary artery disease)  Assessment and Plan of Treatment: You underwent a cardiac angiogram and received a stent to the second obtuse marginal artery (OM2). PLEASE CONTINUE ASPIRIN 81MG DAILY AND PLAVIX 75MG DAILY. DO NOT STOP THESE MEDICATIONS FOR ANY REASON AS THEY ARE KEEPING YOUR STENT OPEN AND PREVENTING A HEART ATTACK. Avoid strenuous activity or heavy lifting for the next five days. Do not take a bath or swim for the next five days; you may shower. For any bleeding or hematoma formation (hardened blood collection under the skin) at the access site of RIGHT WRIST please hold pressure and go to the emergency room. Please follow up with Dr. Montana Lindo in 1-2 weeks. For recurrent chest pain, please call your doctor or go to the emergency room.

## 2022-05-07 NOTE — DISCHARGE NOTE PROVIDER - HOSPITAL COURSE
58 y/o Mandarin speaking M, recent active smoker (quit  one month ago), PMH of HTN, HLD, newly diagnosed DM (HgA1c 8.5 on recent labs 3/15), Dilated cardiomyopathy (EF 20%) with frequent PVCs, ?AFib (cannot tolerate Xarelto 2/2 hematuria), Kidney stone, GIB in 11/2021 (now on ASA), known CAD, s/p Cath 3/16/22 with Dr Ag:   PATRICE mRCA (90%), residual mLCx/OM1/OM2 Dz, EDP 13 and now presents for staged PCI.  Pt endorses compliance on DAPT.  Denies any hospitalizations or surgeries since his last procedure.  He currently denies active CP, SOB, palpitations, diaphoresis, orthopnea, PND, dizziness, syncope, abdominal pain/discomfort, LE swelling or any other complaints at this time. In light of patient's risk factors and recent LHC showing residual LCx lesion, pt now presents for recommended Staged PCI if clinically indicated.     s/p cardiac cath (5/6/22): PATRICE x1 OM2, PATRICE x1 dLCx; diffuse mLAD 30%, LM no significant disease.     Pt seen and evaluated on day of discharge. VSS, labs unremarkable, physical exam benign w/ R radial access site stable w/o hematoma/bleeding. Pt w/ short bursts of NSVT on telemetry overnight (VSS, asymptomatic) - ____________BETA BLOCKER???___________. Hospital course reviewed with attending cardiologist and patient deemed stable for discharge. Prescriptions sent to patient's preferred pharmacy. Discharge instructions reviewed with patient and patient verbalized understands. Pt to follow up with Dr. Montana Lindo within 1-2 weeks of discharge.       Cardiac Rehab (STEMI/NSTEMI/ACS/Unstable Angina/CHF/Post PCI):            *Education on benefits of Cardiac Rehab provided to patient: Yes/No         *Referral and Prescription Given for Cardiac Rehab : Yes/No.  If No, Why Not?         *Pt given list of locations & instructed to contact their insurance company to review list of participating providers    Statin Prescribed (STEMI/NSTEMI/UA &/OR PCI this admission):  Yes/No; If No, Why Not?    DAPT: Prescriptions for Aspirin/Plavix/Brilinta/Effient e-prescribed to patient's pharmacy Yes/No__.                *No Aspirin/Plavix/Brilinta/Effient prescribed due to ___.       56 y/o Mandarin speaking M, recent active smoker (quit  one month ago), PMH of HTN, HLD, newly diagnosed DM (HgA1c 8.5 on recent labs 3/15), Dilated cardiomyopathy (EF 20%) with frequent PVCs, ?AFib (cannot tolerate Xarelto 2/2 hematuria), Kidney stone, GIB in 11/2021 (now on ASA), known CAD, s/p Cath 3/16/22 with Dr Ag:   PATRICE mRCA (90%), residual mLCx/OM1/OM2 Dz, EDP 13 and now presents for staged PCI.  Pt endorses compliance on DAPT.  Denies any hospitalizations or surgeries since his last procedure.  He currently denies active CP, SOB, palpitations, diaphoresis, orthopnea, PND, dizziness, syncope, abdominal pain/discomfort, LE swelling or any other complaints at this time. In light of patient's risk factors and recent LHC showing residual LCx lesion, pt now presents for recommended Staged PCI if clinically indicated.     s/p cardiac cath (5/6/22): PATRICE x1 OM2, PATRICE x1 dLCx; diffuse mLAD 30%, LM no significant disease.     Pt seen and evaluated on day of discharge. VSS, labs unremarkable, physical exam benign w/ R radial access site stable w/o hematoma/bleeding. Pt w/ PVCs on telemetry overnight (VSS, asymptomatic) - known Hx of frequent PVCs likely 2/2 dilated cardiomyopathy. Pt given Toprol 50mg PO x1, and pt to take his Bystolic 20mg PO QD today, 5/7/22. Hospital course reviewed with attending cardiologist and patient deemed stable for discharge. Prescriptions sent to patient's preferred pharmacy. Discharge instructions reviewed with patient and patient verbalized understands. Pt to follow up with Dr. Montana Lindo within 1-2 weeks of discharge.       Cardiac Rehab (STEMI/NSTEMI/ACS/Unstable Angina/CHF/Post PCI):            *Education on benefits of Cardiac Rehab provided to patient: Yes/No         *Referral and Prescription Given for Cardiac Rehab : Yes/No.  If No, Why Not?         *Pt given list of locations & instructed to contact their insurance company to review list of participating providers    Statin Prescribed (STEMI/NSTEMI/UA &/OR PCI this admission):  Yes/No; If No, Why Not?    DAPT: Prescriptions for Aspirin/Plavix e-prescribed to patient's pharmacy Yes/No__.                *No Aspirin/Plavix/Brilinta/Effient prescribed due to ___.

## 2022-05-07 NOTE — DISCHARGE NOTE NURSING/CASE MANAGEMENT/SOCIAL WORK - PATIENT PORTAL LINK FT
You can access the FollowMyHealth Patient Portal offered by [facility] by registering at the following website: [email or web address]. By joining [facility]’s FollowMyHealth portal, you will also be able to view your health information using other applications (apps) compatible with our system.

## 2022-05-07 NOTE — DISCHARGE NOTE PROVIDER - NSDCMRMEDTOKEN_GEN_ALL_CORE_FT
Aspirin Enteric Coated 81 mg oral delayed release tablet: 1 tab(s) orally once a day  Bystolic 20 mg oral tablet: 1 tab(s) orally once a day  Entresto 24 mg-26 mg oral tablet: 1 tab(s) orally 2 times a day  Farxiga 10 mg oral tablet: 1 tab(s) orally once a day  Lipitor 40 mg oral tablet: 1 tab(s) orally once a day  pantoprazole 40 mg oral delayed release tablet: 1 tab(s) orally once a day   Plavix 75 mg oral tablet: 1 tab(s) orally once a day    Aspirin Enteric Coated 81 mg oral delayed release tablet: 1 tab(s) orally once a day  Bystolic 20 mg oral tablet: 1 tab(s) orally once a day; RESTART TODAY 5/7/22.  Cardiac Rehabilitation: 3x / week for 12 weeks for diagnosis of Coronary Artery Disease (cardiologist: Dr. Montana Lindo)  Entresto 24 mg-26 mg oral tablet: 1 tab(s) orally 2 times a day  Farxiga 10 mg oral tablet: 1 tab(s) orally once a day  Lipitor 40 mg oral tablet: 1 tab(s) orally once a day  pantoprazole 40 mg oral delayed release tablet: 1 tab(s) orally once a day   Plavix 75 mg oral tablet: 1 tab(s) orally once a day

## 2022-05-07 NOTE — DISCHARGE NOTE NURSING/CASE MANAGEMENT/SOCIAL WORK - NSDCPEFALRISK_GEN_ALL_CORE
For information on Fall & Injury Prevention, visit: [email or web address] OR  [email or web address] OR  https://www.cdc.gov/steadi/patient.html

## 2022-05-07 NOTE — DISCHARGE NOTE PROVIDER - NSDCFUADDAPPT_GEN_ALL_CORE_FT
Please follow up with your cardiologist, Dr. Montana Lindo, within 1-2 weeks of discharge from the hospital.

## 2022-05-12 DIAGNOSIS — Z79.82 LONG TERM (CURRENT) USE OF ASPIRIN: ICD-10-CM

## 2022-05-12 DIAGNOSIS — Z79.02 LONG TERM (CURRENT) USE OF ANTITHROMBOTICS/ANTIPLATELETS: ICD-10-CM

## 2022-05-12 DIAGNOSIS — E78.5 HYPERLIPIDEMIA, UNSPECIFIED: ICD-10-CM

## 2022-05-12 DIAGNOSIS — Z95.5 PRESENCE OF CORONARY ANGIOPLASTY IMPLANT AND GRAFT: ICD-10-CM

## 2022-05-12 DIAGNOSIS — I42.0 DILATED CARDIOMYOPATHY: ICD-10-CM

## 2022-05-12 DIAGNOSIS — I25.118 ATHEROSCLEROTIC HEART DISEASE OF NATIVE CORONARY ARTERY WITH OTHER FORMS OF ANGINA PECTORIS: ICD-10-CM

## 2022-05-12 DIAGNOSIS — I10 ESSENTIAL (PRIMARY) HYPERTENSION: ICD-10-CM

## 2022-05-12 DIAGNOSIS — E11.9 TYPE 2 DIABETES MELLITUS WITHOUT COMPLICATIONS: ICD-10-CM

## 2022-05-12 DIAGNOSIS — F17.210 NICOTINE DEPENDENCE, CIGARETTES, UNCOMPLICATED: ICD-10-CM

## 2022-05-30 PROCEDURE — 85025 COMPLETE CBC W/AUTO DIFF WBC: CPT

## 2022-05-30 PROCEDURE — C1874: CPT

## 2022-05-30 PROCEDURE — C1725: CPT

## 2022-05-30 PROCEDURE — 83721 ASSAY OF BLOOD LIPOPROTEIN: CPT

## 2022-05-30 PROCEDURE — 80061 LIPID PANEL: CPT

## 2022-05-30 PROCEDURE — 82550 ASSAY OF CK (CPK): CPT

## 2022-05-30 PROCEDURE — 80048 BASIC METABOLIC PNL TOTAL CA: CPT

## 2022-05-30 PROCEDURE — 83036 HEMOGLOBIN GLYCOSYLATED A1C: CPT

## 2022-05-30 PROCEDURE — C1887: CPT

## 2022-05-30 PROCEDURE — 82553 CREATINE MB FRACTION: CPT

## 2022-05-30 PROCEDURE — 82962 GLUCOSE BLOOD TEST: CPT

## 2022-05-30 PROCEDURE — 80053 COMPREHEN METABOLIC PANEL: CPT

## 2022-05-30 PROCEDURE — 83735 ASSAY OF MAGNESIUM: CPT

## 2022-05-30 PROCEDURE — C1753: CPT

## 2022-05-30 PROCEDURE — 85730 THROMBOPLASTIN TIME PARTIAL: CPT

## 2022-05-30 PROCEDURE — C1769: CPT

## 2022-05-30 PROCEDURE — 85610 PROTHROMBIN TIME: CPT

## 2022-05-30 PROCEDURE — C1894: CPT

## 2022-05-30 PROCEDURE — 36415 COLL VENOUS BLD VENIPUNCTURE: CPT

## 2022-10-27 NOTE — H&P ADULT - NSCORESITESY/N_GEN_A_CORE_RD
Called pt in regards to message below. I informed pt we would cancel appointment scheduled in April and sent a recall letter in a few months. Pt verbally confirmed instructions and thanked me for calling.    No